# Patient Record
Sex: FEMALE | Race: WHITE | URBAN - METROPOLITAN AREA
[De-identification: names, ages, dates, MRNs, and addresses within clinical notes are randomized per-mention and may not be internally consistent; named-entity substitution may affect disease eponyms.]

---

## 2017-01-09 DIAGNOSIS — D60.0 CHRONIC ACQUIRED PURE RED CELL APLASIA (H): ICD-10-CM

## 2017-01-09 LAB
BASOPHILS # BLD AUTO: 0 10E9/L (ref 0–0.2)
BASOPHILS NFR BLD AUTO: 0 %
DIFFERENTIAL METHOD BLD: ABNORMAL
EOSINOPHIL # BLD AUTO: 0 10E9/L (ref 0–0.7)
EOSINOPHIL NFR BLD AUTO: 0.3 %
ERYTHROCYTE [DISTWIDTH] IN BLOOD BY AUTOMATED COUNT: 12.6 % (ref 10–15)
HCT VFR BLD AUTO: 42.7 % (ref 35–47)
HGB BLD-MCNC: 14.4 G/DL (ref 11.7–15.7)
LYMPHOCYTES # BLD AUTO: 0.9 10E9/L (ref 0.8–5.3)
LYMPHOCYTES NFR BLD AUTO: 23.2 %
MCH RBC QN AUTO: 34.3 PG (ref 26.5–33)
MCHC RBC AUTO-ENTMCNC: 33.7 G/DL (ref 31.5–36.5)
MCV RBC AUTO: 102 FL (ref 78–100)
MONOCYTES # BLD AUTO: 0.3 10E9/L (ref 0–1.3)
MONOCYTES NFR BLD AUTO: 7.5 %
NEUTROPHILS # BLD AUTO: 2.7 10E9/L (ref 1.6–8.3)
NEUTROPHILS NFR BLD AUTO: 69 %
PLATELET # BLD AUTO: 95 10E9/L (ref 150–450)
RBC # BLD AUTO: 4.2 10E12/L (ref 3.8–5.2)
WBC # BLD AUTO: 3.9 10E9/L (ref 4–11)

## 2017-01-09 PROCEDURE — 85025 COMPLETE CBC W/AUTO DIFF WBC: CPT | Performed by: FAMILY MEDICINE

## 2017-01-09 PROCEDURE — 36415 COLL VENOUS BLD VENIPUNCTURE: CPT | Performed by: FAMILY MEDICINE

## 2017-01-12 ENCOUNTER — CARE COORDINATION (OUTPATIENT)
Dept: ONCOLOGY | Facility: CLINIC | Age: 52
End: 2017-01-12

## 2017-01-12 NOTE — PROGRESS NOTES
"Pt left message yesterday, 1/11/17 asking about lab results and when to return to see Dr. Murray.  She stated that she is starting [employer] duty which is every Thursday for 4 months.  She stated that since Dr. Murray is only in on Thursday's wondering if can delay return to clinic.    Received message from triage colleague, Magaly VERNON, that she released lab results for pt.  Magaly also said pt \"was told she should see Dr Murray earlier than June but she doesn't think it;s neccesary b/c her counts are stable.\"  Message sent to Dr. Murray about [employer] Jury duty and asking about when pt should return to clinic.  "

## 2017-03-03 ENCOUNTER — CARE COORDINATION (OUTPATIENT)
Dept: ONCOLOGY | Facility: CLINIC | Age: 52
End: 2017-03-03

## 2017-03-03 NOTE — PROGRESS NOTES
"Discussed pt with Dr. Murray today, 3/3/17.  He stated he is fine with pt not returning to see him until June, as currently scheduled, as long as nothing comes up.  RN left general message for pt on her \"cell\" phone (per individual who answered home phone) [phone].  Stated calling to touch base with her regarding her call in Jan.  Nothing to worry about though requested call back to discuss her current status.   "

## 2017-04-14 ENCOUNTER — CARE COORDINATION (OUTPATIENT)
Dept: ONCOLOGY | Facility: CLINIC | Age: 52
End: 2017-04-14

## 2017-04-14 DIAGNOSIS — D61.3 IDIOPATHIC APLASTIC ANEMIA (H): Primary | ICD-10-CM

## 2017-04-14 RX ORDER — PREDNISONE 5 MG/1
TABLET ORAL
Qty: 23 TABLET | Refills: 3 | Status: SHIPPED | OUTPATIENT
Start: 2017-04-14

## 2017-04-14 NOTE — PROGRESS NOTES
Received call from Dr. Buck Murray this morning stating need to refill pt's Prednisone prescription.  He stated thinks pt is taking 7.5 mg daily and would like her to decrease to 5 mg daily.    RN left message for pt requesting call back to verify current daily dose is 7.5 mg Prednisone and discuss change to 5 mg daily; also need to verify where to send prescription.  Left second message stating just trying to catch her re: earlier message.  Requested call back to discuss.  Spoke with pt this afternoon.  She verified she has been taking Prednisone 7.5 mg every other day and is agreeable with plan to decrease to 5 mg every other day.  She also stated she is out of medication at this time.  Author stated would verify plan with Dr. Murray and sent prescription to [facility] in [employer].  Will plan to call back only if any changes.  Pt appreciated assistance.  Verified information with Dr. Ignacio Murray via phone conversation.  Prescription sent to [facility] in [employer] as per pt's instructions.

## 2017-07-08 ENCOUNTER — HEALTH MAINTENANCE LETTER (OUTPATIENT)
Age: 52
End: 2017-07-08

## 2018-09-06 ENCOUNTER — CARE COORDINATION (OUTPATIENT)
Dept: ONCOLOGY | Facility: CLINIC | Age: 53
End: 2018-09-06

## 2018-09-06 NOTE — PROGRESS NOTES
"On 9/5/18, author received late call from pt stating that she had labs on 9/4/18 at [facility], facility on 9/4.  Labs included WBC down to 3.2 with ANC 1.8, Hgb 13.8 and plts 106.  Plts within her usual but previously her WBC has been higher although most recent previous lab author sees is from 8/21/17 ([facility]).  Pt reports lab draw was part of a annual physical for 9/5/18.  Pt stated she was instructed by PCP to call Dr. Murray's office re: lab result.  Pt reports she has been off Prednisone for about 1 year.  Previously on Prednisone for recurrent episodes of aplastic anemia.  She last saw Dr. Ignacio Murray on 6/17/16.  Has a return appt scheduled for 10/19/18.    Pt reports that overall she has been well though had a virus like infection about 1 month ago where she had a lot of muscle and joint pain as well as fatigue.  Currently (last couple days) she has \"cold symptoms\" with stuffy nose, scratch throat, some mild upper airway congestion and some fatigue.  She states these are same symptoms son and  have had.  Also stated she is taking some DayQuil and NyQuil for symptoms.     Pt also noted she is seeing a endocrinologist who has her on hydrocortisone 7.5 mg (1 1/2 tabs, each tab 5 mg) daily related to her adrenal insufficiency.  RN stated she will contact Dr. Ignacio Murray regarding current situation and plan.    RN sent e-mail today, 9/6/18.     9/7/18  Per message from Dr. Murray on 9/6:  \"She,    I think f/u in october ok. I am in the BMT clinic as doc of the month oct 2, 3, 8, 9 if she wants to be seem earlier.     CBC looks pretty good. Let me know.    Buck    Left message for pt reviewing above information and requesting call back to discuss including if she wants to move appt up to Oct 2,3,8 or 9.    "

## 2018-11-16 ENCOUNTER — OFFICE VISIT (OUTPATIENT)
Dept: TRANSPLANT | Facility: CLINIC | Age: 53
End: 2018-11-16
Attending: INTERNAL MEDICINE
Payer: COMMERCIAL

## 2018-11-16 VITALS
RESPIRATION RATE: 16 BRPM | OXYGEN SATURATION: 100 % | HEART RATE: 81 BPM | BODY MASS INDEX: 19.41 KG/M2 | SYSTOLIC BLOOD PRESSURE: 109 MMHG | TEMPERATURE: 96.9 F | HEIGHT: 66 IN | DIASTOLIC BLOOD PRESSURE: 76 MMHG | WEIGHT: 120.8 LBS

## 2018-11-16 DIAGNOSIS — D61.3 IDIOPATHIC APLASTIC ANEMIA (H): ICD-10-CM

## 2018-11-16 DIAGNOSIS — D60.9 ACQUIRED PURE RED CELL APLASIA (H): ICD-10-CM

## 2018-11-16 LAB
ALBUMIN SERPL-MCNC: 3.8 G/DL (ref 3.4–5)
ALP SERPL-CCNC: 79 U/L (ref 40–150)
ALT SERPL W P-5'-P-CCNC: 18 U/L (ref 0–50)
ANION GAP SERPL CALCULATED.3IONS-SCNC: 4 MMOL/L (ref 3–14)
AST SERPL W P-5'-P-CCNC: 23 U/L (ref 0–45)
BASOPHILS # BLD AUTO: 0 10E9/L (ref 0–0.2)
BASOPHILS NFR BLD AUTO: 0.3 %
BILIRUB SERPL-MCNC: 1.1 MG/DL (ref 0.2–1.3)
BUN SERPL-MCNC: 22 MG/DL (ref 7–30)
CALCIUM SERPL-MCNC: 8.4 MG/DL (ref 8.5–10.1)
CHLORIDE SERPL-SCNC: 106 MMOL/L (ref 94–109)
CO2 SERPL-SCNC: 29 MMOL/L (ref 20–32)
CREAT SERPL-MCNC: 0.84 MG/DL (ref 0.52–1.04)
DIFFERENTIAL METHOD BLD: ABNORMAL
EOSINOPHIL # BLD AUTO: 0 10E9/L (ref 0–0.7)
EOSINOPHIL NFR BLD AUTO: 0.5 %
ERYTHROCYTE [DISTWIDTH] IN BLOOD BY AUTOMATED COUNT: 13.1 % (ref 10–15)
GFR SERPL CREATININE-BSD FRML MDRD: 70 ML/MIN/1.7M2
GLUCOSE SERPL-MCNC: 93 MG/DL (ref 70–99)
HCT VFR BLD AUTO: 43.2 % (ref 35–47)
HGB BLD-MCNC: 14.5 G/DL (ref 11.7–15.7)
IMM GRANULOCYTES # BLD: 0 10E9/L (ref 0–0.4)
IMM GRANULOCYTES NFR BLD: 0 %
LYMPHOCYTES # BLD AUTO: 1.6 10E9/L (ref 0.8–5.3)
LYMPHOCYTES NFR BLD AUTO: 41.2 %
MCH RBC QN AUTO: 34.4 PG (ref 26.5–33)
MCHC RBC AUTO-ENTMCNC: 33.6 G/DL (ref 31.5–36.5)
MCV RBC AUTO: 102 FL (ref 78–100)
MONOCYTES # BLD AUTO: 0.6 10E9/L (ref 0–1.3)
MONOCYTES NFR BLD AUTO: 14.4 %
NEUTROPHILS # BLD AUTO: 1.7 10E9/L (ref 1.6–8.3)
NEUTROPHILS NFR BLD AUTO: 43.6 %
NRBC # BLD AUTO: 0 10*3/UL
NRBC BLD AUTO-RTO: 0 /100
PLATELET # BLD AUTO: 127 10E9/L (ref 150–450)
POTASSIUM SERPL-SCNC: 4.5 MMOL/L (ref 3.4–5.3)
PROT SERPL-MCNC: 6.9 G/DL (ref 6.8–8.8)
RBC # BLD AUTO: 4.22 10E12/L (ref 3.8–5.2)
SODIUM SERPL-SCNC: 139 MMOL/L (ref 133–144)
WBC # BLD AUTO: 3.9 10E9/L (ref 4–11)

## 2018-11-16 PROCEDURE — G0463 HOSPITAL OUTPT CLINIC VISIT: HCPCS | Mod: ZF

## 2018-11-16 PROCEDURE — 80053 COMPREHEN METABOLIC PANEL: CPT | Performed by: INTERNAL MEDICINE

## 2018-11-16 PROCEDURE — 85025 COMPLETE CBC W/AUTO DIFF WBC: CPT | Performed by: INTERNAL MEDICINE

## 2018-11-16 RX ORDER — SUMATRIPTAN 25 MG/1
TABLET, FILM COATED ORAL
Refills: 0 | COMMUNITY
Start: 2018-10-08

## 2018-11-16 RX ORDER — CITALOPRAM HYDROBROMIDE 20 MG/1
20 TABLET ORAL
COMMUNITY
Start: 2016-06-07 | End: 2017-06-07

## 2018-11-16 RX ORDER — HYDROCORTISONE 5 MG/1
7.5 TABLET ORAL
COMMUNITY
Start: 2018-06-12

## 2018-11-16 ASSESSMENT — PAIN SCALES - GENERAL: PAINLEVEL: NO PAIN (0)
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
## 2018-11-16 NOTE — MR AVS SNAPSHOT
After Visit Summary   11/16/2018    Genet Bolaños    MRN: [id number]           Patient Information     Date Of Birth          1965        Visit Information        Provider Department      11/16/2018 9:30 AM Art Murray MD [facility] Blood and Marrow Transplant        Today's Diagnoses     Acquired pure red cell aplasia (H)        Idiopathic aplastic anemia (H)              [facility] ([facility])  [address] [place] [address]  Phone: [phone]  Clinic Hours:   Monday-Thursday:7am to 7pm   Friday: 7am to 5pm   Weekends and holidays:    8am to noon (in general)  If your fever is 100.5  or greater,   call the clinic.  After hours call the   hospital at [phone] or   [phone]. Ask for the BMT   fellow on-call            Follow-ups after your visit        Who to contact     If you have questions or need follow up information about today's clinic visit or your schedule please contact [facility] directly at [phone].  Normal or non-critical lab and imaging results will be communicated to you by [employer], letter or phone within 4 business days after the clinic has received the results. If you do not hear from us within 7 days, please contact the clinic through [employer] or phone. If you have a critical or abnormal lab result, we will notify you by phone as soon as possible.  Submit refill requests through [employer] or call your pharmacy and they will forward the refill request to us. Please allow 3 business days for your refill to be completed.          Additional Information About Your Visit        [employer] Information     [employer] gives you secure access to your electronic health record. If you see a primary care provider, you can also send messages to your care team and make appointments. If you have questions, please call your primary care clinic.  If you do not have a primary care provider, please call [phone] and they  "will assist you.        Care EveryWhere ID     This is your Care EveryWhere ID. This could be used by other organizations to access your [facility] medical records  [id number]        Your Vitals Were     Pulse Temperature Respirations Height Pulse Oximetry BMI (Body Mass Index)    81 96.9  F (36.1  C) (Oral) 16 1.676 m (5' 5.98\") 100% 19.51 kg/m2       Blood Pressure from Last 3 Encounters:   11/16/18 109/76   06/17/16 129/85   06/04/15 109/79    Weight from Last 3 Encounters:   11/16/18 54.8 kg (120 lb 12.8 oz)   06/04/15 49.5 kg (109 lb 3.2 oz)   06/05/14 51.3 kg (113 lb 3.2 oz)              We Performed the Following     CBC with platelets differential     Comprehensive metabolic panel        Recent Review Flowsheet Data     BMT Recent Results Latest Ref Rng & Units 5/2/2013 8/1/2013 12/12/2013 6/5/2014 6/4/2015 1/9/2017 11/16/2018    WBC 4.0 - 11.0 10e9/L 3.1(L) 3.4(L) 3.2(L) 3.2(L) 3.9(L) 3.9(L) 3.9(L)    Hemoglobin 11.7 - 15.7 g/dL 13.6 13.4 12.7 13.3 13.8 14.4 14.5    Platelet Count 150 - 450 10e9/L 90(L) 79(L) 78(L) 87(L) 90(L) 95(L) 127(L)    Neutrophils (Absolute) 1.6 - 8.3 10e9/L 1.4(L) 1.6 1.3(L) 1.2(L) 1.5(L) 2.7 1.7    INR 0.86 - 1.14 - - - - - - -    Sodium 133 - 144 mmol/L 141 138 141 137 142 - 139    Potassium 3.4 - 5.3 mmol/L 3.9 4.2 4.3 4.5 3.9 - 4.5    Chloride 94 - 109 mmol/L 103 103 103 104 107 - 106    Glucose 70 - 99 mg/dL 76 87 69 88 90 - 93    Urea Nitrogen 7 - 30 mg/dL 15 15 14 17 16 - 22    Creatinine 0.52 - 1.04 mg/dL 0.73 0.75 0.76 0.83 0.85 - 0.84    Calcium (Total) 8.5 - 10.1 mg/dL 8.8 8.7 8.7 8.5 8.2(L) - 8.4(L)    Protein (Total) 6.8 - 8.8 g/dL 6.7(L) 6.4(L) 6.2(L) 6.2(L) 6.4(L) - 6.9    Albumin 3.4 - 5.0 g/dL 3.9 3.7(L) 3.8(L) 3.7(L) 3.6 - 3.8    Alkaline Phosphatase 40 - 150 U/L 78 61 69 63 63 - 79    AST 0 - 45 U/L 33 29 29 31 15 - 23    ALT 0 - 50 U/L 32 31 28 30 18 - 18    MCV 78 - 100 fl 102(H) 106(H) 107(H) 107(H) 102(H) 102(H) 102(H)               Primary Care Provider " Office Phone # Fax #    Annmarie Suazo [phone]       [facility] [place] [facility] [place] [address]        Equal Access to Services     LEONIDES MORALES : Chava stephanie ribeiro trevo Sojoeali, waaxda luqadaha, qaybta kaalmada adejuan david, tisha popenora bui. So [facility] [phone].    ATENCIÓN: Si habla español, tiene a hawk disposición servicios gratuitos de asistencia lingüística. Llame al [phone].    We comply with applicable federal civil rights laws and [place] laws. We do not discriminate on the basis of race, color, national origin, age, disability, sex, sexual orientation, or gender identity.            Thank you!     Thank you for choosing [facility]  for your care. Our goal is always to provide you with excellent care. Hearing back from our patients is one way we can continue to improve our services. Please take a few minutes to complete the written survey that you may receive in the mail after your visit with us. Thank you!             Your Updated Medication List - Protect others around you: Learn how to safely use, store and throw away your medicines at www.disposemymeds.org.          This list is accurate as of 11/16/18 10:09 AM.  Always use your most recent med list.                   Brand Name Dispense Instructions for use Diagnosis    ADVIL 200 MG capsule   Generic drug:  ibuprofen      Take 400 mg by mouth every 4 hours as needed.        FLONASE 50 MCG/ACT spray   Generic drug:  fluticasone      1 spray by Both Nostrils route daily.        hydrocortisone 5 MG tablet    CORTEF     Take 7.5 mg by mouth        multivitamin per tablet      Take 1 tablet by mouth daily.        * predniSONE 10 MG tablet    DELTASONE    100 tablet    Take  by mouth. Taper from 30 mg daily as instructed.    Aplastic anemia (H), Nausea with vomiting, Dehydration       * predniSONE 5 MG tablet    DELTASONE    100 tablet    Take 10 mg by mouth every  other day, or as directed by Dr. Murray.    Aplastic anemia, unspecified (H), Multiple thyroid nodules       * predniSONE 5 MG tablet    DELTASONE    23 tablet    Take Prednisone 5 mg, orally, every other day.    Idiopathic aplastic anemia (H)       SUMAtriptan 25 MG tablet    IMITREX     TAKE 1 TAB BY MOUTH AS NEEDED FOR MIGRAINE. MAY REPEAT AFTER 2 HR IF NEEDED. MAX 8 TAB/24 HR, 9 D/MO        TYLENOL 325 MG tablet   Generic drug:  acetaminophen      Take 2 tablets by mouth as needed.        vitamin B complex with vitamin C Tabs tablet    STRESS TAB     Take 1 tablet by mouth daily.        vitamin D 2000 units tablet      Take 1 tablet by mouth daily.        zolpidem 10 MG tablet    AMBIEN     Take 1 tablet by mouth nightly as needed.        * Notice:  This list has 3 medication(s) that are the same as other medications prescribed for you. Read the directions carefully, and ask your doctor or other care provider to review them with you.

## 2018-11-16 NOTE — NURSING NOTE
"Oncology Rooming Note    November 16, 2018 9:30 AM   Genet Bolaños is a 53 year old female who presents for:    Chief Complaint   Patient presents with     Oncology Clinic Visit     Return: Aplastic anemia      Initial Vitals: /76 (BP Location: Right arm, Patient Position: Sitting, Cuff Size: Adult Regular)  Pulse 81  Temp 96.9  F (36.1  C) (Oral)  Resp 16  Ht 1.676 m (5' 5.98\")  Wt 54.8 kg (120 lb 12.8 oz)  SpO2 100%  BMI 19.51 kg/m2 Estimated body mass index is 19.51 kg/(m^2) as calculated from the following:    Height as of this encounter: 1.676 m (5' 5.98\").    Weight as of this encounter: 54.8 kg (120 lb 12.8 oz). Body surface area is 1.6 meters squared.  No Pain (0) Comment: Data Unavailable   No LMP recorded.  Allergies reviewed: Yes  Medications reviewed: Yes    Medications: Medication refills not needed today.  Pharmacy name entered into [employer]:    [id number] [place] [facility] - [address] [facility] - [place], [place] - [address] [place] [address]    Clinical concerns: N/A    5 minutes for nursing intake (face to face time)     Michelle Desouza CMA              "

## 2019-11-02 ENCOUNTER — HEALTH MAINTENANCE LETTER (OUTPATIENT)
Age: 54
End: 2019-11-02

## 2020-11-14 ENCOUNTER — HEALTH MAINTENANCE LETTER (OUTPATIENT)
Age: 55
End: 2020-11-14

## 2021-09-12 ENCOUNTER — HEALTH MAINTENANCE LETTER (OUTPATIENT)
Age: 56
End: 2021-09-12

## 2021-11-07 ENCOUNTER — HEALTH MAINTENANCE LETTER (OUTPATIENT)
Age: 56
End: 2021-11-07

## 2022-01-02 ENCOUNTER — HEALTH MAINTENANCE LETTER (OUTPATIENT)
Age: 57
End: 2022-01-02

## 2022-11-19 ENCOUNTER — HEALTH MAINTENANCE LETTER (OUTPATIENT)
Age: 57
End: 2022-11-19

## 2023-04-09 ENCOUNTER — HEALTH MAINTENANCE LETTER (OUTPATIENT)
Age: 58
End: 2023-04-09

## 2023-11-19 ENCOUNTER — HEALTH MAINTENANCE LETTER (OUTPATIENT)
Age: 58
End: 2023-11-19